# Patient Record
Sex: FEMALE | Race: BLACK OR AFRICAN AMERICAN | NOT HISPANIC OR LATINO | Employment: FULL TIME | URBAN - METROPOLITAN AREA
[De-identification: names, ages, dates, MRNs, and addresses within clinical notes are randomized per-mention and may not be internally consistent; named-entity substitution may affect disease eponyms.]

---

## 2022-07-12 ENCOUNTER — OFFICE VISIT (OUTPATIENT)
Dept: URGENT CARE | Facility: CLINIC | Age: 52
End: 2022-07-12
Payer: COMMERCIAL

## 2022-07-12 VITALS
WEIGHT: 165 LBS | HEART RATE: 97 BPM | BODY MASS INDEX: 28.17 KG/M2 | RESPIRATION RATE: 20 BRPM | TEMPERATURE: 99 F | HEIGHT: 64 IN | OXYGEN SATURATION: 98 % | SYSTOLIC BLOOD PRESSURE: 120 MMHG | DIASTOLIC BLOOD PRESSURE: 79 MMHG

## 2022-07-12 DIAGNOSIS — H10.9 BACTERIAL CONJUNCTIVITIS: ICD-10-CM

## 2022-07-12 DIAGNOSIS — T36.95XA ANTIBIOTIC-INDUCED YEAST INFECTION: ICD-10-CM

## 2022-07-12 DIAGNOSIS — B96.89 BACTERIAL SINUSITIS: Primary | ICD-10-CM

## 2022-07-12 DIAGNOSIS — B37.9 ANTIBIOTIC-INDUCED YEAST INFECTION: ICD-10-CM

## 2022-07-12 DIAGNOSIS — J32.9 BACTERIAL SINUSITIS: Primary | ICD-10-CM

## 2022-07-12 LAB
CTP QC/QA: YES
SARS-COV-2 RDRP RESP QL NAA+PROBE: NEGATIVE

## 2022-07-12 PROCEDURE — U0002: ICD-10-PCS | Mod: QW,S$GLB,, | Performed by: PHYSICIAN ASSISTANT

## 2022-07-12 PROCEDURE — 99203 OFFICE O/P NEW LOW 30 MIN: CPT | Mod: S$GLB,,, | Performed by: PHYSICIAN ASSISTANT

## 2022-07-12 PROCEDURE — 99203 PR OFFICE/OUTPT VISIT, NEW, LEVL III, 30-44 MIN: ICD-10-PCS | Mod: S$GLB,,, | Performed by: PHYSICIAN ASSISTANT

## 2022-07-12 PROCEDURE — U0002 COVID-19 LAB TEST NON-CDC: HCPCS | Mod: QW,S$GLB,, | Performed by: PHYSICIAN ASSISTANT

## 2022-07-12 RX ORDER — AMOXICILLIN AND CLAVULANATE POTASSIUM 875; 125 MG/1; MG/1
1 TABLET, FILM COATED ORAL 2 TIMES DAILY
Qty: 14 TABLET | Refills: 0 | Status: SHIPPED | OUTPATIENT
Start: 2022-07-12 | End: 2022-07-19

## 2022-07-12 RX ORDER — FLUCONAZOLE 150 MG/1
150 TABLET ORAL
Qty: 2 TABLET | Refills: 0 | Status: SHIPPED | OUTPATIENT
Start: 2022-07-12 | End: 2022-07-18

## 2022-07-12 RX ORDER — POLYMYXIN B SULFATE AND TRIMETHOPRIM 1; 10000 MG/ML; [USP'U]/ML
1 SOLUTION OPHTHALMIC EVERY 6 HOURS
Qty: 10 ML | Refills: 0 | Status: SHIPPED | OUTPATIENT
Start: 2022-07-12 | End: 2022-07-19

## 2022-07-12 RX ORDER — ATORVASTATIN CALCIUM 20 MG/1
20 TABLET, FILM COATED ORAL DAILY
COMMUNITY
Start: 2022-04-14

## 2022-07-12 NOTE — PATIENT INSTRUCTIONS
-Please take antibiotic to completion.  -Apply antibiotic drops to completion.  -Take diflucan now. Take second dose at completion of antibiotics.    Below are suggestions for symptomatic relief:   -Tylenol every 4 hours OR ibuprofen every 6 hours as needed for pain/fever.   -Salt water gargles to soothe throat pain.   -Chloroseptic spray also helps to numb throat pain.   -Nasal saline spray reduces inflammation and dryness.   -Warm face compresses to help with facial sinus pain/pressure.   -Vicks vapor rub at night.   -Flonase OTC or Nasacort OTC for nasal congestion.   -Simple foods like chicken noodle soup.   -Delsym helps with coughing at night   -Zyrtec/Claritin during the day & Benadryl at night may help with allergies.     If you DO NOT have Hypertension or any history of palpitations, it is ok to take over the counter Sudafed or Mucinex D or Allegra-D or Claritin-D or Zyrtec-D.  If you do take one of the above, it is ok to combine that with plain over the counter Mucinex or Allegra or Claritin or Zyrtec. If, for example, you are taking Zyrtec -D, you can combine that with Mucinex, but not Mucinex-D.  If you are taking Mucinex-D, you can combine that with plain Allegra or Claritin or Zyrtec.   If you DO have Hypertension or palpitations, it is safe to take Coricidin HBP for relief of sinus symptoms.    Please follow up with your primary care provider within 2-5 days if your signs and symptoms have not resolved or worsen.     If your condition worsens or fails to improve we recommend that you receive another evaluation at the emergency room immediately or contact your primary medical clinic to discuss your concerns.   You must understand that you have received an Urgent Care treatment only and that you may be released before all of your medical problems are known or treated. You, the patient, will arrange for follow up care as instructed.

## 2022-07-12 NOTE — PROGRESS NOTES
"Subjective:       Patient ID: Dorcas Rosado is a 52 y.o. female.    Vitals:  height is 5' 4" (1.626 m) and weight is 74.8 kg (165 lb). Her oral temperature is 98.7 °F (37.1 °C). Her blood pressure is 120/79 and her pulse is 97. Her respiration is 20 and oxygen saturation is 98%.     Chief Complaint: Sinus Problem and Eye Problem (Right )    Patient presents with sinus congestion that worsened about 3 days ago. She also reports right eye itchiness, redness, and pus drainage. Also complaining with bilateral ear fullness.    Sinus Problem  This is a new problem. The current episode started in the past 7 days (4 days ago). The problem has been gradually worsening since onset. There has been no fever. Her pain is at a severity of 0/10. She is experiencing no pain. Associated symptoms include congestion, coughing, ear pain (pressure), a hoarse voice, sinus pressure, sneezing and a sore throat. Pertinent negatives include no chills, headaches, neck pain or shortness of breath. Treatments tried: tylenol cold and sinus, phillip seltzer, mucinex d. The treatment provided no relief.       Constitution: Negative for chills, fatigue and fever.   HENT: Positive for ear pain (pressure), congestion, postnasal drip, sinus pain, sinus pressure and sore throat.    Neck: Negative for neck pain, neck stiffness and painful lymph nodes.   Cardiovascular: Negative for chest pain, palpitations and sob on exertion.   Eyes: Positive for eye discharge, eye itching and eye redness. Negative for eye trauma, foreign body in eye, eye pain, photophobia, vision loss, double vision, blurred vision and eyelid swelling.   Respiratory: Positive for cough. Negative for sputum production and shortness of breath.    Gastrointestinal: Negative for abdominal pain, nausea, vomiting and diarrhea.   Genitourinary: Negative for dysuria, hematuria and pelvic pain.   Musculoskeletal: Negative for pain, muscle cramps and muscle ache.   Skin: Negative for pale, rash " and wound.   Allergic/Immunologic: Positive for sneezing.   Neurological: Negative for dizziness, light-headedness and headaches.   Hematologic/Lymphatic: Negative for swollen lymph nodes.       Objective:      Physical Exam   Constitutional: She is oriented to person, place, and time. She appears well-developed. She is cooperative.  Non-toxic appearance. She does not appear ill. No distress.   HENT:   Head: Normocephalic and atraumatic.   Ears:   Right Ear: External ear and ear canal normal. Tympanic membrane is bulging. A middle ear effusion (clear) is present.   Left Ear: External ear and ear canal normal. Tympanic membrane is bulging. A middle ear effusion (clear) is present.   Nose: Mucosal edema and rhinorrhea present. Right sinus exhibits maxillary sinus tenderness. Right sinus exhibits no frontal sinus tenderness. Left sinus exhibits maxillary sinus tenderness. Left sinus exhibits no frontal sinus tenderness.   Mouth/Throat: Uvula is midline and mucous membranes are normal. Posterior oropharyngeal erythema and cobblestoning present. No oropharyngeal exudate or posterior oropharyngeal edema.   Eyes: EOM and lids are normal. Pupils are equal, round, and reactive to light. Right eye exhibits discharge (purulent). Left eye exhibits no discharge. Right conjunctiva is injected. No scleral icterus. Extraocular movement intact   Neck: Trachea normal and phonation normal. Neck supple.   Cardiovascular: Normal rate, regular rhythm and normal heart sounds.   No murmur heard.Exam reveals no gallop.   Pulmonary/Chest: Effort normal and breath sounds normal. No respiratory distress. She has no decreased breath sounds. She has no wheezes. She has no rhonchi. She has no rales.   Musculoskeletal:         General: No deformity.   Lymphadenopathy:        Head (right side): No submandibular and no tonsillar adenopathy present.        Head (left side): No submandibular and no tonsillar adenopathy present.   Neurological: She is  alert and oriented to person, place, and time. Coordination normal.   Skin: Skin is warm, dry, intact, not diaphoretic and not pale.   Psychiatric: Her speech is normal and behavior is normal. Judgment and thought content normal.   Nursing note and vitals reviewed.        Assessment:       1. Bacterial sinusitis    2. Antibiotic-induced yeast infection    3. Bacterial conjunctivitis        Office Visit on 07/12/2022   Component Date Value Ref Range Status    POC Rapid COVID 07/12/2022 Negative  Negative Final-Edited     Acceptable 07/12/2022 Yes   Final-Edited      Plan:         Bacterial sinusitis  -     POCT COVID-19 Rapid Screening  -     amoxicillin-clavulanate 875-125mg (AUGMENTIN) 875-125 mg per tablet; Take 1 tablet by mouth 2 (two) times daily. for 7 days  Dispense: 14 tablet; Refill: 0    Antibiotic-induced yeast infection  -     fluconazole (DIFLUCAN) 150 MG Tab; Take 1 tablet (150 mg total) by mouth Every 3 (three) days. Take 1st dose now. Take second dose after 3 days if symptoms persist. for 6 days  Dispense: 2 tablet; Refill: 0    Bacterial conjunctivitis  -     polymyxin B sulf-trimethoprim (POLYTRIM) 10,000 unit- 1 mg/mL Drop; Place 1 drop into the right eye every 6 (six) hours. for 7 days  Dispense: 10 mL; Refill: 0      Patient Instructions   -Please take antibiotic to completion.  -Apply antibiotic drops to completion.  -Take diflucan now. Take second dose at completion of antibiotics.    Below are suggestions for symptomatic relief:   -Tylenol every 4 hours OR ibuprofen every 6 hours as needed for pain/fever.   -Salt water gargles to soothe throat pain.   -Chloroseptic spray also helps to numb throat pain.   -Nasal saline spray reduces inflammation and dryness.   -Warm face compresses to help with facial sinus pain/pressure.   -Vicks vapor rub at night.   -Flonase OTC or Nasacort OTC for nasal congestion.   -Simple foods like chicken noodle soup.   -Delsym helps with coughing at  night   -Zyrtec/Claritin during the day & Benadryl at night may help with allergies.     If you DO NOT have Hypertension or any history of palpitations, it is ok to take over the counter Sudafed or Mucinex D or Allegra-D or Claritin-D or Zyrtec-D.  If you do take one of the above, it is ok to combine that with plain over the counter Mucinex or Allegra or Claritin or Zyrtec. If, for example, you are taking Zyrtec -D, you can combine that with Mucinex, but not Mucinex-D.  If you are taking Mucinex-D, you can combine that with plain Allegra or Claritin or Zyrtec.   If you DO have Hypertension or palpitations, it is safe to take Coricidin HBP for relief of sinus symptoms.    Please follow up with your primary care provider within 2-5 days if your signs and symptoms have not resolved or worsen.     If your condition worsens or fails to improve we recommend that you receive another evaluation at the emergency room immediately or contact your primary medical clinic to discuss your concerns.   You must understand that you have received an Urgent Care treatment only and that you may be released before all of your medical problems are known or treated. You, the patient, will arrange for follow up care as instructed.

## 2022-07-12 NOTE — LETTER
July 12, 2022      [facility]  [address] [address]  Phone: [phone]  Fax: [phone]       Patient: Dorcas Rosado   YOB: 1970  Date of Visit: 07/12/2022    To Whom It May Concern:    Michael Rosado  was at [facility] on 07/12/2022. The patient may return to work/school on 07/14/2022 with no restrictions. If you have any questions or concerns, or if I can be of further assistance, please do not hesitate to contact me.    Sincerely,    Lorena Leger PA-C

## 2022-07-24 ENCOUNTER — NURSE TRIAGE (OUTPATIENT)
Dept: ADMINISTRATIVE | Facility: CLINIC | Age: 52
End: 2022-07-24
Payer: COMMERCIAL

## 2022-07-24 ENCOUNTER — OFFICE VISIT (OUTPATIENT)
Dept: URGENT CARE | Facility: CLINIC | Age: 52
End: 2022-07-24
Payer: COMMERCIAL

## 2022-07-24 VITALS
SYSTOLIC BLOOD PRESSURE: 144 MMHG | OXYGEN SATURATION: 98 % | RESPIRATION RATE: 18 BRPM | DIASTOLIC BLOOD PRESSURE: 81 MMHG | TEMPERATURE: 99 F | HEIGHT: 64 IN | BODY MASS INDEX: 28.17 KG/M2 | HEART RATE: 92 BPM | WEIGHT: 165 LBS

## 2022-07-24 DIAGNOSIS — J01.90 ACUTE NON-RECURRENT SINUSITIS, UNSPECIFIED LOCATION: ICD-10-CM

## 2022-07-24 DIAGNOSIS — J02.9 SORE THROAT: Primary | ICD-10-CM

## 2022-07-24 DIAGNOSIS — I10 HYPERTENSION, UNSPECIFIED TYPE: ICD-10-CM

## 2022-07-24 DIAGNOSIS — H92.09 OTALGIA, UNSPECIFIED LATERALITY: ICD-10-CM

## 2022-07-24 DIAGNOSIS — J30.9 ALLERGIC RHINITIS, UNSPECIFIED SEASONALITY, UNSPECIFIED TRIGGER: ICD-10-CM

## 2022-07-24 LAB
CTP QC/QA: YES
SARS-COV-2 RDRP RESP QL NAA+PROBE: NEGATIVE

## 2022-07-24 PROCEDURE — 99214 PR OFFICE/OUTPT VISIT, EST, LEVL IV, 30-39 MIN: ICD-10-PCS | Mod: S$GLB,,, | Performed by: FAMILY MEDICINE

## 2022-07-24 PROCEDURE — 99214 OFFICE O/P EST MOD 30 MIN: CPT | Mod: S$GLB,,, | Performed by: FAMILY MEDICINE

## 2022-07-24 PROCEDURE — U0002: ICD-10-PCS | Mod: QW,S$GLB,, | Performed by: FAMILY MEDICINE

## 2022-07-24 PROCEDURE — U0002 COVID-19 LAB TEST NON-CDC: HCPCS | Mod: QW,S$GLB,, | Performed by: FAMILY MEDICINE

## 2022-07-24 RX ORDER — PREDNISONE 10 MG/1
10 TABLET ORAL 2 TIMES DAILY
Qty: 6 TABLET | Refills: 0 | Status: SHIPPED | OUTPATIENT
Start: 2022-07-24 | End: 2022-07-27

## 2022-07-24 NOTE — PROGRESS NOTES
"Subjective:       Patient ID: Dorcas Rosado is a 52 y.o. female.    Vitals:  height is 5' 4" (1.626 m) and weight is 74.8 kg (165 lb). Her temperature is 98.6 °F (37 °C). Her blood pressure is 144/81 (abnormal) and her pulse is 92. Her respiration is 18 and oxygen saturation is 98%.     Chief Complaint: Otalgia    This is a 52 y.o. female who presents today with a chief complaint of ear pain in both ears, sore throat, and post nasal drip x 12 days . Pt states that she came here on 7/12 and got antibiotics and was told there was fluid in her ears. Pt finished the antibiotics and her symptoms have not gotten any better. Treated with antibiotics, delsym, Mucinex.  Pressure reports history of sinus allergies.  Patient is on Xyzal and Flonase. Denies fever, chills, headache, body aches, chest pain, shortness of breath.  Patient also reports history of borderline hypertension.       Otalgia   There is pain in both ears. This is a new problem. The current episode started 1 to 4 weeks ago. The problem has been unchanged. There has been no fever. The pain is at a severity of 3/10. The pain is mild. Associated symptoms include a sore throat. Pertinent negatives include no abdominal pain, coughing, diarrhea or ear discharge. Associated symptoms comments: Post nasal drip. Treatments tried:  antibiotics, delsym, Mucinex. The treatment provided mild relief. There is no history of a chronic ear infection or hearing loss.       HENT: Positive for ear pain and sore throat. Negative for ear discharge.    Respiratory: Negative for cough.    Gastrointestinal: Negative for abdominal pain and diarrhea.       Objective:      Physical Exam   Constitutional: She is oriented to person, place, and time. She appears well-developed. She is cooperative.  Non-toxic appearance. She does not appear ill. No distress.   HENT:   Head: Normocephalic and atraumatic.   Ears:   Right Ear: Hearing, tympanic membrane, external ear and ear canal normal. " impacted cerumen  Left Ear: Hearing, tympanic membrane, external ear and ear canal normal. impacted cerumen  Nose: Congestion present. No mucosal edema, rhinorrhea or nasal deformity. No epistaxis. Right sinus exhibits no maxillary sinus tenderness and no frontal sinus tenderness. Left sinus exhibits no maxillary sinus tenderness and no frontal sinus tenderness.   Mouth/Throat: Uvula is midline, oropharynx is clear and moist and mucous membranes are normal. No trismus in the jaw. Normal dentition. No uvula swelling.   Eyes: Conjunctivae and lids are normal. Right eye exhibits no discharge. Left eye exhibits no discharge. No scleral icterus.   Neck: Trachea normal and phonation normal. Neck supple.   Cardiovascular: Normal rate, regular rhythm, normal heart sounds and normal pulses.   No murmur heard.  Pulmonary/Chest: Effort normal. No stridor. No respiratory distress. She has no wheezes. She has no rhonchi. She has no rales.   Abdominal: Normal appearance and bowel sounds are normal. She exhibits no distension and no mass. Soft. There is no abdominal tenderness.   Musculoskeletal: Normal range of motion.         General: No deformity. Normal range of motion.      Cervical back: She exhibits no tenderness.   Lymphadenopathy:     She has no cervical adenopathy.   Neurological: She is alert and oriented to person, place, and time. She exhibits normal muscle tone. Coordination normal.   Skin: Skin is warm, dry, intact, not diaphoretic and not pale.   Psychiatric: Her speech is normal and behavior is normal. Judgment and thought content normal.   Nursing note and vitals reviewed.        Assessment:       1. Sore throat    2. Allergic rhinitis, unspecified seasonality, unspecified trigger    3. Acute non-recurrent sinusitis, unspecified location    4. Hypertension, unspecified type    5. Otalgia, unspecified laterality          Plan:         Sore throat  -     POCT COVID-19 Rapid Screening    Allergic rhinitis,  unspecified seasonality, unspecified trigger    Acute non-recurrent sinusitis, unspecified location    Hypertension, unspecified type    Otalgia, unspecified laterality    Other orders  -     predniSONE (DELTASONE) 10 MG tablet; Take 1 tablet (10 mg total) by mouth 2 (two) times daily. for 3 days  Dispense: 6 tablet; Refill: 0

## 2022-07-24 NOTE — TELEPHONE ENCOUNTER
"    Dorcas states she was given medication at [facility] for bacterial sinusitis.  Says she has finished all meds she was givne at that visit on 07/12/2022.  States she still feels like her ears bilaterally are blocked and feels pressure and pain in the ears. Also states she feels swollen lymph nodes in her neck.  Decreased hearing (muffled).  Ears popping.  Two negative Covid 19 tests, yesterday and today.  No fever. Per [facility] triage protocol, recommend she be seen within 24 hours. She was given address / hours to [facility] close to her home.  She said she will go today.  No pcp.  Please contact caller directly with any additional care advice.       Reason for Disposition   Moving the earlobe or touching the ear clearly increases the pain   Decreased hearing (or another adult says that the ear canal is completely blocked with discharge)   Swollen lymph node near ear    Additional Information   Negative: Recently examined and diagnosed with "Otitis Externa" or "Swimmer's Ear"   Negative: [1] Stiff neck (can't touch chin to chest) AND [2] fever   Negative: [1] Stiff neck (can't touch chin to chest) AND [2] headache   Negative: Bony area of skull behind the ear is pink or swollen   Negative: Patient sounds very sick or weak to the triager   Negative: [1] SEVERE pain and [2] not improved 2 hours after analgesic medication (e.g., ibuprofen or acetaminophen)     Pain to ears 5 of 10.   Negative: Fever > 100.4 F (38.0 C)   Negative: Redness or swelling of outer ear (ear lobe)   Negative: [1] Stiff neck (can't touch chin to chest) AND [2] no fever or headache   Negative: Diabetes mellitus or weak immune system (e.g., HIV positive, cancer chemo, splenectomy, organ transplant, chronic steroids)   Negative: Yellow or green discharge from ear canal    Protocols used: EARACHE-A-AH, EAR - SWIMMER'S-A-AH      "

## 2023-01-25 ENCOUNTER — OFFICE VISIT (OUTPATIENT)
Dept: HEMATOLOGY/ONCOLOGY | Facility: CLINIC | Age: 53
End: 2023-01-25
Payer: COMMERCIAL

## 2023-01-25 VITALS
WEIGHT: 178.56 LBS | DIASTOLIC BLOOD PRESSURE: 74 MMHG | HEIGHT: 64 IN | OXYGEN SATURATION: 99 % | TEMPERATURE: 98 F | HEART RATE: 98 BPM | SYSTOLIC BLOOD PRESSURE: 143 MMHG | RESPIRATION RATE: 18 BRPM | BODY MASS INDEX: 30.48 KG/M2

## 2023-01-25 DIAGNOSIS — Z91.89 AT HIGH RISK FOR BREAST CANCER: Primary | ICD-10-CM

## 2023-01-25 DIAGNOSIS — N60.99 ATYPICAL DUCTAL HYPERPLASIA OF BREAST: ICD-10-CM

## 2023-01-25 PROCEDURE — 99999 PR PBB SHADOW E&M-EST. PATIENT-LVL III: ICD-10-PCS | Mod: PBBFAC,,, | Performed by: STUDENT IN AN ORGANIZED HEALTH CARE EDUCATION/TRAINING PROGRAM

## 2023-01-25 PROCEDURE — 99204 OFFICE O/P NEW MOD 45 MIN: CPT | Mod: S$GLB,,, | Performed by: STUDENT IN AN ORGANIZED HEALTH CARE EDUCATION/TRAINING PROGRAM

## 2023-01-25 PROCEDURE — 99204 PR OFFICE/OUTPT VISIT, NEW, LEVL IV, 45-59 MIN: ICD-10-PCS | Mod: S$GLB,,, | Performed by: STUDENT IN AN ORGANIZED HEALTH CARE EDUCATION/TRAINING PROGRAM

## 2023-01-25 PROCEDURE — 99999 PR PBB SHADOW E&M-EST. PATIENT-LVL III: CPT | Mod: PBBFAC,,, | Performed by: STUDENT IN AN ORGANIZED HEALTH CARE EDUCATION/TRAINING PROGRAM

## 2023-01-25 RX ORDER — LINACLOTIDE 145 UG/1
145 CAPSULE, GELATIN COATED ORAL EVERY MORNING
COMMUNITY
Start: 2022-12-22

## 2023-01-25 RX ORDER — MELOXICAM 15 MG/1
15 TABLET ORAL DAILY PRN
COMMUNITY
Start: 2022-10-15

## 2023-01-25 RX ORDER — LIFITEGRAST 50 MG/ML
1 SOLUTION/ DROPS OPHTHALMIC 2 TIMES DAILY
COMMUNITY
Start: 2022-10-04

## 2023-01-25 RX ORDER — CHOLECALCIFEROL (VITAMIN D3) 50 MCG
TABLET ORAL
COMMUNITY

## 2023-01-26 PROBLEM — Z91.89 AT HIGH RISK FOR BREAST CANCER: Status: ACTIVE | Noted: 2023-01-26

## 2023-01-26 PROBLEM — N60.99 ATYPICAL DUCTAL HYPERPLASIA OF BREAST: Status: ACTIVE | Noted: 2023-01-26

## 2023-01-26 NOTE — PROGRESS NOTES
Reason For Consultation:   Increased lifetime risk of breast cancer    Referring Provider:   None provided    Records Obtained: Records of the patients history including those obtained from the referring provider were reviewed and summarized in detail.    HPI:   Dorcas Rosado is a carolee 53yo woman who presents for consultation of increased risk of breast cancer. She is postmenopausal. She presented for screening mammogram on 2/10/22 which was benign; T-C lifetime risk 43.1%.     Today she reports that she is feeling well. Denies recent breast changes or concerns. Recently moved here from [place] and was following with breast specialist there.     High Risk Breast cancer specific history:  - Age: 52 y.o.   - Height:  5'4''  - Weight: 172lbs  - Breast density per BI-RADS:  heterogeneously dense  - Age at menarche:  12yo  - Number of pregnancies: ; age of first live birth: 16yo  - History of breast feeding: No.   - Age at menopause, if applicable:  s/p IDALIA-BSO in 2020. Denies menopausal symptoms.  -Uterus and ovaries intact: No: s/p IDALIA-BSO as above  - HRT: Yes- on estrogen and progesterone x 2.5 years, stopped in   - Genetic testing: Yes - BRCA negative   - Personal history of cancer: No  - Previous chest radiation exposure between ages 10-30 years old: No  - Personal history of breast biopsy: Yes - hx of multiple biopsies, several benign and one ADH  - Ashkenazi Worship Inheritance: No  - Family history of cancer:  Yes- paternal aunt- breast cancer dx at 51yo, paternal cousin- breast cancer dx at 39yo    Social History:  Tobacco use:  Denies  Alcohol use:  Socially  Exercise regimen: Walks regularly  Employment: works from home    SEE CALCULATED RISK BELOW.     Past Medical   Past Medical History:   Diagnosis Date    Allergy    There is no problem list on file for this patient.    Social History   Social History     Tobacco Use    Smoking status: Former    Smokeless tobacco: Never   Substance Use  "Topics    Alcohol use: Not Currently    Drug use: Never     Family History  Family History   Problem Relation Age of Onset    Diabetes Mother     Diabetes Father      Medications    Current Outpatient Medications:     atorvastatin (LIPITOR) 20 MG tablet, Take 20 mg by mouth once daily., Disp: , Rfl:     cholecalciferol, vitamin D3, (VITAMIN D3) 50 mcg (2,000 unit) Tab, Take by mouth., Disp: , Rfl:     ergocalciferol, vitamin D2, (VITAMIN D ORAL), , Disp: , Rfl:     finasteride-tadalafil 5-5 mg Cap, , Disp: , Rfl:     LINZESS 145 mcg Cap capsule, Take 145 mcg by mouth every morning., Disp: , Rfl:     meloxicam (MOBIC) 15 MG tablet, Take 15 mg by mouth daily as needed., Disp: , Rfl:     spironolactone-hydrochlorothiazide (ALDACTAZIDE) 50-50 mg per tablet, , Disp: , Rfl:     XIIDRA 5 % Dpet, Place 1 drop into both eyes 2 (two) times daily., Disp: , Rfl:     Allergies  Review of patient's allergies indicates:   Allergen Reactions    Hydrocodone Nausea Only and Nausea And Vomiting       Review of Systems       See above   All other systems reviewed and are negative.    Objective:      Vitals:   Vitals:    01/25/23 1606   BP: (!) 143/74   Pulse: 98   Resp: 18   Temp: 98.2 °F (36.8 °C)   TempSrc: Oral   SpO2: 99%   Weight: 81 kg (178 lb 9.2 oz)   Height: 5' 4" (1.626 m)     BMI: Body mass index is 30.65 kg/m².   Body surface area is 1.91 meters squared.    Physical Exam  ECOG 0   General: well appearing, in no apparent distress  HEENT: Normocephalic, EOMI, anicteric sclerae, MMM  Neck: supple, without cervical or supraclavicular lymphadenopathy.  Heart: regular rate and rhythm, normal S1 and S2, no murmurs, gallops or rubs.  Lungs: Clear to auscultation bilaterally, no increased wob  Breast: no appreciable masses, skin changes, or nipple abnormality bilaterally, no axillary LAD  Abdomen: Soft, nontender, nondistended with normal bowel sounds. No hepatosplenomegaly.  Extremities: No LE edema or joint effusion  Skin: warm, " well-perfused, no rash  Neurologic: Alert and oriented x 4, normal speech and gait   Psychiatric: Conversing appropriately with providers throughout today's encounter.      Laboratory Data: reviewed most recent   Imaging: reviewed most recent    Assessment:   Ms. Rosado is a carolee 53yo woman with hx of ADH who presents today for evaluation for increased risk of breast cancer.     1. Increased risk of breast cancer/History of ADH:  Tyrer-Cuzick (TC) lifetime risk [facility] 36.6[facility] We discussed that TC score will categorize your lifetime risk of being diagnosed with breast cancer. Categories are as such: Average risk <15%, Intermediate risk 15-19%, and High risk > or = to 20%. I reviewed recommendations for women with elevated TC score including clinical breast exam q6-12mo, consideration of referral to genetic counseling, annual screening mammogram, annual breast MRI, consideration of risk reduction strategies and breast awareness.      We discussed that risk factors are categorized into 2 groups: Modifiable and Non-modifiable. Modifiable risk factors include use of hormones, alcohol, smoking, diet and exercise. Non-modifiable risk factors include breast density, genetics, chest radiation, previous pregnancies, age of first period, and age of menopause. For women at high risk for breast cancer, endocrine therapy can reduce the risk of invasive and/or in situ breast cancers. Discussed that Tamoxifen 20 mg daily for 5 years (or tamoxifen 5mg daily for 3 years) has shown to reduce risk of breast cancer by 49% and women with ADH/ALH or LCIS have an even more significant reduction of risk of 86%. Aromatase inhibitors for 5 years have also shown risk reduction in terms of 50-60%. At current, there is not adequate data to recommend longer courses of therapy more than 5 years for risk reduction. While the above have been shown to lower the risk of breast cancer incidence, there is no survival benefit  in patients who don't have breast cancer.     We reviewed Lifestyle modifications which have shown benefit:  Limit alcohol consumption to less than 1 drink per day (1 ounce liquor, 6 oz wine, 8 oz beer)  Avoid smoking.  Exercise at least 150 minutes per week of moderate intensity aerobic activity or at least 75 minutes of vigorous activity. Exercise can lower the relative risk of breast cancer by ~18-20%.  Maintain healthy weight and avoid post-menopausal weight gain. Avoid processed foods and eat more lean proteins, fruits and vegetables.     Discussed available resources including genetic counseling, nutrition, weight management and reviewed the plan for future screening:   Semiannual (every 6 months) CBE (clinical breast exam).   Annual screening mammogram alternating with annual breast US (pt would prefer this over MRI)      Plan:     Patient has opted out of chemoprevention but will call in the future if she wishes to pursue. Can discuss further at follow up.   Patient elects to proceed with alternating annual mammogram with annual breast US (would prefer this to MRI)  Patient will follow up with PCP or GYN for semiannual CBE along with annual mammogram in 2/2023 (will see if she would be a good candidate for contrast enhanced mammography) and annual breast US in 8/2023 (can discuss at time of follow up if she would prefer to proceed with US or mammogram).   Lifestyle modifications as detailed above.   5.   Encouraged breast awareness, including monthly breast self-exams.       Questions were encouraged and answered to patient's satisfaction, and patient verbalized understanding of information and agreement with the plan. Advised patient to RTC with any interval changes or concerns.  RTC for virtual visit in 6 months.     Gail Mo MD      Med Onc Chart Routing  Urgent    Follow up with physician 6 months. Virtual visit in 6mo   Follow up with [facility]    Infusion scheduling note    Injection scheduling note     Labs    Imaging Mammogram   Screening mammogram due in Feb   Pharmacy appointment    Other referrals

## 2023-01-27 ENCOUNTER — TELEPHONE (OUTPATIENT)
Dept: HEMATOLOGY/ONCOLOGY | Facility: CLINIC | Age: 53
End: 2023-01-27
Payer: COMMERCIAL

## 2023-01-27 NOTE — TELEPHONE ENCOUNTER
----- Message from Gail Mo MD sent at 1/27/2023 11:32 AM CST -----  Regarding: RE: orders  Contact: [phone]  Sure, just placed new order. thanks  ----- Message -----  From: Meliza Billingsley RN  Sent: 1/27/2023  10:25 AM CST  To: Gail Mo MD  Subject: FW: orders                                       Can you change her order to reflect 3d mammo  ----- Message -----  From: Kiki Lorenz  Sent: 1/27/2023  10:16 AM CST  To: [facility] Staff  Subject: orders                                           Pt requesting order change for Mammo exam. Pt states orders need to state 3D Mammo. Please call to discuss further.

## 2023-03-01 ENCOUNTER — HOSPITAL ENCOUNTER (OUTPATIENT)
Dept: RADIOLOGY | Facility: HOSPITAL | Age: 53
Discharge: HOME | End: 2023-03-01
Attending: STUDENT IN AN ORGANIZED HEALTH CARE EDUCATION/TRAINING PROGRAM
Payer: COMMERCIAL

## 2023-03-01 VITALS — BODY MASS INDEX: 29.37 KG/M2 | WEIGHT: 172 LBS | HEIGHT: 64 IN

## 2023-03-01 DIAGNOSIS — N60.99 ATYPICAL DUCTAL HYPERPLASIA OF BREAST: ICD-10-CM

## 2023-03-01 DIAGNOSIS — Z91.89 AT HIGH RISK FOR BREAST CANCER: ICD-10-CM

## 2023-03-01 PROCEDURE — 77067 MAMMO DIGITAL SCREENING WITH CONTRAST, BILATERAL: ICD-10-PCS | Mod: 26,,, | Performed by: RADIOLOGY

## 2023-03-01 PROCEDURE — 77067 SCR MAMMO BI INCL CAD: CPT | Mod: 26,,, | Performed by: RADIOLOGY

## 2023-03-01 PROCEDURE — 77067 SCR MAMMO BI INCL CAD: CPT | Mod: TC

## 2023-03-01 PROCEDURE — 25500020 PHARM REV CODE 255: Performed by: STUDENT IN AN ORGANIZED HEALTH CARE EDUCATION/TRAINING PROGRAM

## 2023-03-01 RX ADMIN — IOHEXOL 100 ML: 350 INJECTION, SOLUTION INTRAVENOUS at 11:03

## 2023-04-20 ENCOUNTER — PATIENT MESSAGE (OUTPATIENT)
Dept: HEMATOLOGY/ONCOLOGY | Facility: CLINIC | Age: 53
End: 2023-04-20
Payer: COMMERCIAL

## 2023-04-21 ENCOUNTER — PATIENT MESSAGE (OUTPATIENT)
Dept: HEMATOLOGY/ONCOLOGY | Facility: CLINIC | Age: 53
End: 2023-04-21
Payer: COMMERCIAL

## 2023-08-11 ENCOUNTER — TELEPHONE (OUTPATIENT)
Dept: HEMATOLOGY/ONCOLOGY | Facility: CLINIC | Age: 53
End: 2023-08-11
Payer: COMMERCIAL

## 2023-09-05 ENCOUNTER — TELEPHONE (OUTPATIENT)
Dept: HEMATOLOGY/ONCOLOGY | Facility: CLINIC | Age: 53
End: 2023-09-05
Payer: COMMERCIAL

## 2023-09-05 NOTE — TELEPHONE ENCOUNTER
----- Message from Monica Martinez sent at 9/5/2023  9:11 AM CDT -----  Regarding: Mammo Appt  Contact: Pt  Pt is requesting a callback regarding Mammo Appt. Pt stated she have to be schedule at the [facility] Location for a diagnostic Mammo. Pt would like to be seen on today. Please adv       Confirmed contact below:   Contact Name:Dorcasbaltazar Ponce  Phone Number: [phone]

## 2023-09-05 NOTE — TELEPHONE ENCOUNTER
Reached out to [facility] Dias. Stated they will contact the pt that follow up mammo is not needed at this time (last results 3/1/23 benign).

## 2023-09-05 NOTE — TELEPHONE ENCOUNTER
----- Message from Sweta Catherine sent at 9/5/2023  4:47 PM CDT -----  Regarding: self [phone]  Type:  Mammogram       Caller is requesting to schedule their annual mammogram appointment.  Order is not listed in EPIC.  Please enter order and contact patient to schedule.    Name of Caller: Patient       Where would they like the mammogram performed? [facility]       Would the patient rather a call back or a response via [facility]? either       Best Call Back Number: [phone]       Additional Information: Patient requesting the order to be sent for March of 2024. Patient states she is needing this yearly. Thank you

## 2023-09-18 DIAGNOSIS — Z91.89 AT HIGH RISK FOR BREAST CANCER: Primary | ICD-10-CM

## 2023-09-18 NOTE — PROGRESS NOTES
Pt high risk and opts for bilateral breast US rather than MRI. Will alternate this with yearly screening MMG (had contrast enhanced MMG in 3/2023 which was benign).

## 2023-09-19 ENCOUNTER — PATIENT MESSAGE (OUTPATIENT)
Dept: HEMATOLOGY/ONCOLOGY | Facility: CLINIC | Age: 53
End: 2023-09-19
Payer: COMMERCIAL

## 2023-09-20 ENCOUNTER — PATIENT MESSAGE (OUTPATIENT)
Dept: HEMATOLOGY/ONCOLOGY | Facility: CLINIC | Age: 53
End: 2023-09-20
Payer: COMMERCIAL

## 2023-09-21 ENCOUNTER — TELEPHONE (OUTPATIENT)
Dept: RADIOLOGY | Facility: HOSPITAL | Age: 53
End: 2023-09-21
Payer: COMMERCIAL

## 2023-09-21 NOTE — TELEPHONE ENCOUNTER
----- Message from Kg Preciado sent at 9/21/2023 12:44 PM CDT -----  Regarding: FW: Reschedule appt  Contact: [phone]    ----- Message -----  From: Pavan Jimenez  Sent: 9/21/2023  12:22 PM CDT  To: #  Subject: Reschedule appt                                  Hi, pt is scheduled for Monday afternoon and is asking to spk with someone to reschedule the time to something earlier(because she is leaving town) or tomorrow. Pls call the pt at [phone] to help pt change appt.

## 2023-09-25 ENCOUNTER — HOSPITAL ENCOUNTER (OUTPATIENT)
Dept: RADIOLOGY | Facility: HOSPITAL | Age: 53
Discharge: HOME | End: 2023-09-25
Attending: STUDENT IN AN ORGANIZED HEALTH CARE EDUCATION/TRAINING PROGRAM
Payer: COMMERCIAL

## 2023-09-25 DIAGNOSIS — Z91.89 AT HIGH RISK FOR BREAST CANCER: ICD-10-CM

## 2023-09-25 PROCEDURE — 76641 ULTRASOUND BREAST COMPLETE: CPT | Mod: 26,50,, | Performed by: RADIOLOGY

## 2023-09-25 PROCEDURE — 76641 ULTRASOUND BREAST COMPLETE: CPT | Mod: TC,50

## 2023-09-25 PROCEDURE — 76641 US BREAST BILATERAL COMPLETE: ICD-10-PCS | Mod: 26,50,, | Performed by: RADIOLOGY

## 2024-02-12 ENCOUNTER — TELEPHONE (OUTPATIENT)
Dept: HEMATOLOGY/ONCOLOGY | Facility: CLINIC | Age: 54
End: 2024-02-12
Payer: COMMERCIAL

## 2024-02-12 DIAGNOSIS — Z91.89 AT HIGH RISK FOR BREAST CANCER: Primary | ICD-10-CM

## 2024-02-12 DIAGNOSIS — N60.99 ATYPICAL DUCTAL HYPERPLASIA OF BREAST: ICD-10-CM

## 2024-02-12 NOTE — TELEPHONE ENCOUNTER
Order placed  Message sent to [email or web address].     Pt made aware she is thankful for call.       ----- Message from Yadira Hamilton sent at 2/12/2024  2:45 PM CST -----  Regarding: requesting orders  Contact: pt @[phone]  Pt is calling to speak with someone in the office to request and order for MAMMO SCREEN WITH CONTRAST     Pt is asking for a return call back once the order is in, so that they can be scheduled. Please call pt to advise. Thanks.

## 2024-02-14 ENCOUNTER — TELEPHONE (OUTPATIENT)
Dept: RADIOLOGY | Facility: HOSPITAL | Age: 54
End: 2024-02-14
Payer: COMMERCIAL

## 2024-02-14 NOTE — TELEPHONE ENCOUNTER
[facility] for 3[facility]8/2024 . Patient was instructed on fasting for the contrast mammogram, nothing to eat or drink 4 hours prior to the contrast mammogram.

## 2024-03-07 ENCOUNTER — TELEPHONE (OUTPATIENT)
Dept: RADIOLOGY | Facility: HOSPITAL | Age: 54
End: 2024-03-07
Payer: COMMERCIAL

## 2024-03-08 ENCOUNTER — HOSPITAL ENCOUNTER (OUTPATIENT)
Dept: RADIOLOGY | Facility: HOSPITAL | Age: 54
Discharge: HOME | End: 2024-03-08
Attending: STUDENT IN AN ORGANIZED HEALTH CARE EDUCATION/TRAINING PROGRAM
Payer: COMMERCIAL

## 2024-03-08 DIAGNOSIS — N60.99 ATYPICAL DUCTAL HYPERPLASIA OF BREAST: ICD-10-CM

## 2024-03-08 DIAGNOSIS — Z91.89 AT HIGH RISK FOR BREAST CANCER: ICD-10-CM

## 2024-03-08 PROCEDURE — 25500020 PHARM REV CODE 255: Performed by: STUDENT IN AN ORGANIZED HEALTH CARE EDUCATION/TRAINING PROGRAM

## 2024-03-08 PROCEDURE — 77067 SCR MAMMO BI INCL CAD: CPT | Mod: 26,,, | Performed by: RADIOLOGY

## 2024-03-08 PROCEDURE — 77067 SCR MAMMO BI INCL CAD: CPT | Mod: TC

## 2024-03-08 RX ADMIN — IOHEXOL 100 ML: 350 INJECTION, SOLUTION INTRAVENOUS at 08:03

## 2024-09-24 DIAGNOSIS — Z91.89 AT HIGH RISK FOR BREAST CANCER: Primary | ICD-10-CM

## 2024-09-25 ENCOUNTER — TELEPHONE (OUTPATIENT)
Dept: HEMATOLOGY/ONCOLOGY | Facility: CLINIC | Age: 54
End: 2024-09-25
Payer: COMMERCIAL

## 2024-09-25 ENCOUNTER — TELEPHONE (OUTPATIENT)
Dept: RADIOLOGY | Facility: HOSPITAL | Age: 54
End: 2024-09-25
Payer: COMMERCIAL

## 2024-09-25 NOTE — TELEPHONE ENCOUNTER
----- Message from Ambar Feldman sent at 9/23/2024 11:05 AM CDT -----  Regarding: Patient advice  Contact: Pt  [phone]            Name of Caller:  Dorcas      Contact Preference:  [phone]    Nature of Call: Called to find out if it is time for a ultrasound or not states they usually every 6 months

## 2024-09-25 NOTE — TELEPHONE ENCOUNTER
----- Message from Florinda Key RN sent at 9/25/2024  8:17 AM CDT -----  Regarding: please schedule  Good Morning     Rosalino can you please schedule the patient for Breast US and send message to Anita once completed so that she may schedule patient for f/u visit. Orders are in. Thank you in advance.

## 2024-10-02 ENCOUNTER — HOSPITAL ENCOUNTER (OUTPATIENT)
Dept: RADIOLOGY | Facility: HOSPITAL | Age: 54
Discharge: HOME | End: 2024-10-02
Attending: STUDENT IN AN ORGANIZED HEALTH CARE EDUCATION/TRAINING PROGRAM
Payer: COMMERCIAL

## 2024-10-02 DIAGNOSIS — Z91.89 AT HIGH RISK FOR BREAST CANCER: ICD-10-CM

## 2024-10-02 PROCEDURE — 76641 ULTRASOUND BREAST COMPLETE: CPT | Mod: TC,50

## 2024-10-08 ENCOUNTER — TELEPHONE (OUTPATIENT)
Dept: HEMATOLOGY/ONCOLOGY | Facility: CLINIC | Age: 54
End: 2024-10-08
Payer: COMMERCIAL

## 2024-10-08 NOTE — TELEPHONE ENCOUNTER
Contacted patient via phone call. Patient answered. Scheduled lost to follow up visit. Appointment scheduled. Date and time confirmed with patient.

## 2024-10-15 ENCOUNTER — OFFICE VISIT (OUTPATIENT)
Dept: HEMATOLOGY/ONCOLOGY | Facility: CLINIC | Age: 54
End: 2024-10-15
Payer: COMMERCIAL

## 2024-10-15 VITALS
HEIGHT: 64 IN | WEIGHT: 154.31 LBS | RESPIRATION RATE: 20 BRPM | BODY MASS INDEX: 26.34 KG/M2 | DIASTOLIC BLOOD PRESSURE: 77 MMHG | SYSTOLIC BLOOD PRESSURE: 126 MMHG | HEART RATE: 86 BPM | OXYGEN SATURATION: 100 %

## 2024-10-15 DIAGNOSIS — Z91.89 AT HIGH RISK FOR BREAST CANCER: Primary | ICD-10-CM

## 2024-10-15 PROCEDURE — 99999 PR PBB SHADOW E&M-EST. PATIENT-LVL III: CPT | Mod: PBBFAC,,, | Performed by: STUDENT IN AN ORGANIZED HEALTH CARE EDUCATION/TRAINING PROGRAM

## 2024-10-15 PROCEDURE — 99214 OFFICE O/P EST MOD 30 MIN: CPT | Mod: S$GLB,,, | Performed by: STUDENT IN AN ORGANIZED HEALTH CARE EDUCATION/TRAINING PROGRAM

## 2024-10-15 PROCEDURE — G2211 COMPLEX E/M VISIT ADD ON: HCPCS | Mod: S$GLB,,, | Performed by: STUDENT IN AN ORGANIZED HEALTH CARE EDUCATION/TRAINING PROGRAM

## 2024-10-15 NOTE — PROGRESS NOTES
Reason For Consultation:   Increased lifetime risk of breast cancer- follow up     Referring Provider:   None provided    Records Obtained: Records of the patients history including those obtained from the referring provider were reviewed and summarized in detail.    HPI:   Dorcas Ponce is a carolee 51yo woman who presents for consultation of increased risk of breast cancer. She is postmenopausal. She presented for screening mammogram on 2/10/22 which was benign; T-C lifetime risk 43.1%.     Interval History:  Ms. Ponce returns today for follow up. Denies recent breast changes or concerns today. Feeling well overall.     High Risk Breast cancer specific history:  - Age: 54 y.o.   - Height:  5'4''  - Weight: 172lbs  - Breast density per BI-RADS:  heterogeneously dense  - Age at menarche:  12yo  - Number of pregnancies: ; age of first live birth: 14yo  - History of breast feeding: No.   - Age at menopause, if applicable:  s/p IDALIA-BSO in 2020. Denies menopausal symptoms.  -Uterus and ovaries intact: No: s/p IDALIA-BSO as above  - HRT: Yes- on estrogen and progesterone x 2.5 years, stopped in   - Genetic testing: Yes - BRCA negative   - Personal history of cancer: No  - Previous chest radiation exposure between ages 10-30 years old: No  - Personal history of breast biopsy: Yes - hx of multiple biopsies, several benign and one ADH  - Ashkenazi Gnosticist Inheritance: No  - Family history of cancer:  Yes- paternal aunt- breast cancer dx at 51yo, paternal cousin- breast cancer dx at 41yo    Social History:  Tobacco use:  Denies  Alcohol use:  Socially  Exercise regimen: Walks regularly  Employment: works from home        Patient Active Problem List   Diagnosis    At high risk for breast cancer    Atypical ductal hyperplasia of breast     Medications    Current Outpatient Medications:     atorvastatin (LIPITOR) 20 MG tablet, Take 20 mg by mouth once daily., Disp: , Rfl:     cholecalciferol, vitamin D3,  "(VITAMIN D3) 50 mcg (2,000 unit) Tab, Take by mouth., Disp: , Rfl:     ergocalciferol, vitamin D2, (VITAMIN D ORAL), , Disp: , Rfl:     finasteride-tadalafil 5-5 mg Cap, , Disp: , Rfl:     LINZESS 145 mcg Cap capsule, Take 145 mcg by mouth every morning., Disp: , Rfl:     meloxicam (MOBIC) 15 MG tablet, Take 15 mg by mouth daily as needed., Disp: , Rfl:     spironolactone-hydrochlorothiazide (ALDACTAZIDE) 50-50 mg per tablet, , Disp: , Rfl:     XIIDRA 5 % Dpet, Place 1 drop into both eyes 2 (two) times daily., Disp: , Rfl:     Allergies  Review of patient's allergies indicates:   Allergen Reactions    Hydrocodone Nausea Only and Nausea And Vomiting       Review of Systems  Answers submitted by the patient for this visit:  Review of Systems Questionnaire (Submitted on 10/14/2024)  appetite change : No  unexpected weight change: No  mouth sores: No  visual disturbance: No  cough: No  shortness of breath: No  chest pain: No  abdominal pain: No  diarrhea: No  frequency: No  back pain: No  rash: No  headaches: No  adenopathy: No  nervous/ anxious: No      Objective:      Vitals:   Vitals:    10/15/24 1625   BP: 126/77   BP Location: Right arm   Patient Position: Sitting   Pulse: 86   Resp: 20   SpO2: 100%   Weight: 70 kg (154 lb 5.2 oz)   Height: 5' 4" (1.626 m)     BMI: Body mass index is 26.49 kg/m².   Body surface area is 1.78 meters squared.    Physical Exam  ECOG 0   General: well appearing, in no apparent distress  HEENT: Normocephalic, EOMI, anicteric sclerae, MMM  Neck: supple, without cervical or supraclavicular lymphadenopathy.  Heart: regular rate and rhythm, normal S1 and S2, no murmurs, gallops or rubs.  Lungs: Clear to auscultation bilaterally, no increased wob  Breast: no appreciable masses, skin changes, or nipple abnormality bilaterally, no axillary LAD  Abdomen: Soft, nontender, nondistended with normal bowel sounds. No hepatosplenomegaly.  Extremities: No LE edema or joint effusion  Skin: warm, " well-perfused, no rash  Neurologic: Alert and oriented x 4, normal speech and gait   Psychiatric: Conversing appropriately with providers throughout today's encounter.      Laboratory Data: reviewed most recent   Imaging: reviewed most recent    Assessment/Plan:   Ms. Rosado is a carolee 55yo woman with hx of ADH who presents today for evaluation for increased risk of breast cancer.     #Increased risk of breast cancer/History of ADH:  Tyrer-Cuzick (TC) lifetime risk of 36.6%. We discussed that TC score will categorize your lifetime risk of being diagnosed with breast cancer. Categories are as such: Average risk <15%, Intermediate risk 15-19%, and High risk > or = to 20%. I reviewed recommendations for women with elevated TC score including clinical breast exam q6-12mo, consideration of referral to genetic counseling, annual screening mammogram, annual breast MRI, consideration of risk reduction strategies and breast awareness.     Reviewed modifiable and non-modifiable risk factors, lifestyle modifications, as well as the role of chemoprevention.       --Patient has opted out of chemoprevention but will call in the future if she wishes to pursue. Can discuss further at follow up.   --proceed with alternating annual mammogram (due 3/67268) with annual breast US (would prefer this to MRI)- due 10/2025  --Lifestyle modifications as detailed above.   --Encouraged breast awareness, including monthly breast self-exams.       Questions were encouraged and answered to patient's satisfaction, and patient verbalized understanding of information and agreement with the plan. Advised patient to RTC with any interval changes or concerns.      Gail Mo MD      Med Onc Chart Routing      Follow up with physician    Follow up with [facility] 1 year.   Infusion scheduling note    Injection scheduling note    Labs    Imaging Mammogram   MMG due 3/2025   Pharmacy appointment    Other referrals                   Total time of this  visit, including time spent face to face with patient and/or via video/audio, and also in preparing for today's visit for MDM and documentation. (Medical Decision Making, including consideration of possible diagnoses, management options, complex medical record review, review of diagnostic tests and information, consideration and discussion of significant complications based on comorbidities, and discussion with providers involved with the care of the patient) 30 minutes. Greater than 50% was spent face to face with the patient counseling and coordinating care.

## 2025-02-19 ENCOUNTER — TELEPHONE (OUTPATIENT)
Dept: RADIOLOGY | Facility: HOSPITAL | Age: 55
End: 2025-02-19
Payer: COMMERCIAL

## 2025-02-19 NOTE — TELEPHONE ENCOUNTER
----- Message from KAYLA Howard sent at 2/19/2025  9:04 AM CST -----  Regarding: FW: Request Orders  Contact: Pt @[phone]  Good morning, Can you please assist this patient in getting this MMG scheduled? Thanks in advance!Anita Mirza RN  ----- Message -----  From: Gail Mo MD  Sent: 2/18/2025   4:26 PM CST  To: Anita Fairbanks RN  Subject: RE: Request Orders                               It looks like there is an order from me for a contrast enhanced MMG to be scheduled. Let me know if you can't see it and I can re-order.  ----- Message -----  From: Anita Fairbanks RN  Sent: 2/18/2025   2:31 PM CST  To: Gail Mo MD  Subject: FW: Request Orders                               Can you please place a MMG order for this patient and let me know when it is in please so I can make sure she gets scheduled? Thanks so much!Anita RUTH RN  ----- Message -----  From: Yadira Hamilton  Sent: 2/18/2025   1:52 PM CST  To: Chely Peralta  Subject: Request Orders                                   Pt is calling to speak with someone in the office to request and order for mammogram.   Pt received a letter in the mail to schedule; no order in Epic to schedule from. Pt is asking for a return call back once the order is in, so that they can be scheduled. Please call pt to advise. Thanks.

## 2025-02-19 NOTE — TELEPHONE ENCOUNTER
[facility]2025 . Patient was instructed on fasting for the contrast mammogram, nothing to eat or drink 2 hours prior to the contrast mammogram.

## 2025-03-11 ENCOUNTER — PATIENT MESSAGE (OUTPATIENT)
Dept: RADIOLOGY | Facility: HOSPITAL | Age: 55
End: 2025-03-11
Payer: COMMERCIAL

## 2025-03-17 ENCOUNTER — HOSPITAL ENCOUNTER (OUTPATIENT)
Dept: RADIOLOGY | Facility: HOSPITAL | Age: 55
Discharge: HOME | End: 2025-03-17
Attending: STUDENT IN AN ORGANIZED HEALTH CARE EDUCATION/TRAINING PROGRAM
Payer: COMMERCIAL

## 2025-03-17 DIAGNOSIS — Z91.89 AT HIGH RISK FOR BREAST CANCER: ICD-10-CM

## 2025-03-17 PROCEDURE — 25500020 PHARM REV CODE 255: Performed by: STUDENT IN AN ORGANIZED HEALTH CARE EDUCATION/TRAINING PROGRAM

## 2025-03-17 PROCEDURE — 77067 SCR MAMMO BI INCL CAD: CPT | Mod: TC

## 2025-03-17 RX ADMIN — IOHEXOL 110 ML: 350 INJECTION, SOLUTION INTRAVENOUS at 03:03

## 2025-04-17 ENCOUNTER — OFFICE VISIT (OUTPATIENT)
Dept: HEMATOLOGY/ONCOLOGY | Facility: CLINIC | Age: 55
End: 2025-04-17
Payer: COMMERCIAL

## 2025-04-17 DIAGNOSIS — Z91.89 AT HIGH RISK FOR BREAST CANCER: Primary | ICD-10-CM

## 2025-04-17 NOTE — PROGRESS NOTES
Physical Therapy Visit    Visit Type: Daily Treatment Note  Visit: 22  Referring Provider: Cong Andrade MD  Medical Diagnosis (from order): M17.11 - Primary osteoarthritis of right knee  Z96.651 - Status post total knee replacement, right     SUBJECTIVE                                                                                                               Patient denies changes since last session. Knee has been feeling fine. Still having intermittent knee buckling.   Functional Change: None reported   Walked at work Thursday without crutch       OBJECTIVE                                                                                                                                       Treatment     Therapeutic Exercise  Therapeutic Exercise with Blood Flow Restriction:   Bevvy Personal Tourniquet System utilized    Patient was educated on expectations and symptoms following treatment with use of blood flow restriction.     34 inch cuff utilized with protection sleeve; applied at proximal right lower extremity    Limb Occlusion Pressure 205 mmHg (measured in supine)    Personal Tourniquet Pressure 103 mmHg (80% of LOP)    Blood Pressure: 142/90 mmHg     Repetitions performed: 30/15/15/15 - 30 seconds rest break between each set and at least 60 seconds deflation between each exercise.    Exercises performed:    Therapy Ball hamstring curls with bridge and end with quad set 3 sec (had to reduce to 70% LOP last set of 15, but only can complete 5 reps in time frame)  Leg press, seat 6, foot plate 7, single limb, 25 pounds x 30 reps, then 35 pounds for following sets (with left foot on foot bar), back up to 80% LOP  Seated knee extension isometric, RLE 55 degree angle (knee adjustment at 7, foot cushion at 4)    Patient had good tolerance to BFR with 70-80% occlusion of right Lower Extremity , no adverse reactions.    Educated in scar tissue massage right total knee arthroplasty incision with instruction  The patient location is: [place]  The chief complaint leading to consultation is: breast cancer    Visit type: audiovisual    Each patient to whom he or she provides medical services by telemedicine is:  (1) informed of the relationship between the physician and patient and the respective role of any other health care provider with respect to management of the patient; and (2) notified that he or she may decline to receive medical services by telemedicine and may withdraw from such care at any time.      Reason For Consultation:   Increased lifetime risk of breast cancer- follow up     Referring Provider:   None provided    Records Obtained: Records of the patients history including those obtained from the referring provider were reviewed and summarized in detail.    HPI:   Dorcsa Ponce is a carolee 53yo woman who presents for consultation of increased risk of breast cancer. She is postmenopausal. She presented for screening mammogram on 2/10/22 which was benign; T-C lifetime risk 43.1%.     Interval History:  Ms. Ponce returns today for follow up. She has been doing fairly well since her last visit. Reports recent difficulty with diverticulitis; now following with GI and undergoing workup. Denies recent breast changes or concerns today.     High Risk Breast cancer specific history:  - Age: 54 y.o.   - Height:  5'4''  - Weight: 172lbs  - Breast density per BI-RADS:  heterogeneously dense  - Age at menarche:  14yo  - Number of pregnancies: ; age of first live birth: 16yo  - History of breast feeding: No.   - Age at menopause, if applicable:  s/p IDALIA-BSO in 2020. Denies menopausal symptoms.  -Uterus and ovaries intact: No: s/p IDALIA-BSO as above  - HRT: Yes- on estrogen and progesterone x 2.5 years, stopped in   - Genetic testing: Yes - BRCA negative   - Personal history of cancer: No  - Previous chest radiation exposure between ages 10-30 years old: No  - Personal history of breast biopsy: Yes - hx of  in multi-directional skin rolling.      Neuromuscular Re-Education  Forward step up, 2 inch step, BUE support  - x 10 RLE leading, cueing for forward weight shift   Forward step u, 4 inch step, BUE progressing to single UE support  - x 10 RLE, cueing for hip drop     Skilled input: verbal instruction/cues, tactile instruction/cues, facilitation, inhibition and as detailed above    Writer verbally educated and received verbal consent for hand placement, positioning of patient, and techniques to be performed today from patient for therapist position for techniques, modality application and clothing adjustments for techniques as described above and how they are pertinent to the patient's plan of care.  Home Exercise Program  Access Code: [email or web address]  URL: [email or web address]  Date: 10/02/2024  Prepared by: Megan Poon    Exercises  - Supine Ankle Pumps  - 3-4 x daily - 7 x weekly - 20 reps  - Supine Quad Set  - 3-4 x daily - 7 x weekly - 10 reps - 5 hold  - Supine Heel Slide with Strap  - 3-4 x daily - 7 x weekly - 10 reps - 10 hold  - Seated Heel Slide  - 3-4 x daily - 7 x weekly - 10 reps - 10 hold  - Seated Hamstring Stretch  - 3-4 x daily - 7 x weekly - 2 reps - 30 hold  - Long Sitting Calf Stretch with Strap  - 3-4 x daily - 7 x weekly - 3 sets - 30sec hold  - Supine Knee Extension Stretch on Towel Roll  - 3-4 x daily - 7 x weekly - 3 sets - 1-3 minutes hold  - Prone Terminal Knee Extension  - 2 x daily - 7 x weekly - 2 sets - 10 reps - 5secs hold  - Prone Knee Extension Hang  - 2 x daily - 7 x weekly - 3 sets - 10 reps - 1-3 minutes hold  - Sit to Stand Without Arm Support  - 2 x daily - 7 x weekly - 2 sets - 5 reps  - Side Stepping with Resistance at Thighs and Counter Support  - 1 x daily - 7 x weekly - 2 sets - 10 reps  - Single Leg Balance with Clock Reach  - 1 x daily - 7 x weekly - 2 sets - 10 reps      ASSESSMENT                                                                        multiple biopsies, several benign and one ADH  - Ashkenazi Mormon Inheritance: No  - Family history of cancer:  Yes- paternal aunt- breast cancer dx at 49yo, paternal cousin- breast cancer dx at 41yo    Social History:  Tobacco use:  Denies  Alcohol use:  Socially  Exercise regimen: Walks regularly  Employment: works from home        Patient Active Problem List   Diagnosis    At high risk for breast cancer    Atypical ductal hyperplasia of breast     Medications    Current Outpatient Medications:     atorvastatin (LIPITOR) 20 MG tablet, Take 20 mg by mouth once daily., Disp: , Rfl:     cholecalciferol, vitamin D3, (VITAMIN D3) 50 mcg (2,000 unit) Tab, Take by mouth., Disp: , Rfl:     ergocalciferol, vitamin D2, (VITAMIN D ORAL), , Disp: , Rfl:     finasteride-tadalafil 5-5 mg Cap, , Disp: , Rfl:     LINZESS 145 mcg Cap capsule, Take 145 mcg by mouth every morning., Disp: , Rfl:     meloxicam (MOBIC) 15 MG tablet, Take 15 mg by mouth daily as needed., Disp: , Rfl:     spironolactone-hydrochlorothiazide (ALDACTAZIDE) 50-50 mg per tablet, , Disp: , Rfl:     Allergies  Review of patient's allergies indicates:   Allergen Reactions    Hydrocodone Nausea Only and Nausea And Vomiting       Review of Systems  Answers submitted by the patient for this visit:  Review of Systems Questionnaire (Submitted on 4/17/2025)  appetite change : No  unexpected weight change: No  mouth sores: No  visual disturbance: No  cough: No  shortness of breath: No  chest pain: No  abdominal pain: No  diarrhea: No  frequency: No  back pain: No  rash: No  headaches: No  adenopathy: No  nervous/ anxious: No        Objective:      Vitals:   There were no vitals filed for this visit. Virtual visit     BMI: There is no height or weight on file to calculate BMI.   There is no height or weight on file to calculate BSA.    Physical Exam  ECOG 0   General: well appearing, in no apparent distress  HEENT: Normocephalic, EOMI  Lungs: no increased wob  Skin: no                                       Patient with improved tolerance to single limb leg press this session compared. She is challenged with swiss ball hamstring curl with bridge, requiring reduction of pressure on blood flow unit and isn't able to complete full last set. She has improved weight shift forward and eccentric control with 2 inch step up but is challenged with 4 inch step without compensation at the hip. Patient will continue to benefit from skilled physical therapy to address deficits.   Education:   - Results of above outlined education: Verbalizes understanding and Demonstrates understanding    PLAN                                                                                                                           Suggestions for next session as indicated: Progress per plan of care       Therapy procedure time and total treatment time can be found documented on the Time Entry flowsheet     rash  Neurologic: Alert and oriented x 4, normal speech  Psychiatric: Conversing appropriately with providers throughout today's encounter.      Laboratory Data: reviewed most recent   Imaging: reviewed most recent      Assessment/Plan:   Ms. Rosado is a carolee 55yo woman with hx of ADH who presents today for evaluation for increased risk of breast cancer.     #Increased risk of breast cancer/History of ADH:  Tyrer-Cuzick (TC) lifetime risk of 33.98%. We discussed that TC score will categorize your lifetime risk of being diagnosed with breast cancer. Categories are as such: Average risk <15%, Intermediate risk 15-19%, and High risk > or = to 20%. I reviewed recommendations for women with elevated TC score including clinical breast exam q6-12mo, consideration of referral to genetic counseling, annual screening mammogram, annual breast MRI, consideration of risk reduction strategies and breast awareness.     Reviewed modifiable and non-modifiable risk factors, lifestyle modifications, as well as the role of chemoprevention.       --Patient has opted out of chemoprevention but will call in the future if she wishes to pursue. Can discuss further at follow up.   --proceed with alternating annual mammogram (due 3/2026) with annual breast MRI. Had contrast enhanced MMG 3/2025 that was normal. Discussed that with contrast enhanced MMG would not necessarily need MRI. She would like to move ahead with MRI in September but plan for contrast enhanced MMG only following  --Lifestyle modifications as detailed above.   --Encouraged breast awareness, including monthly breast self-exams.       Questions were encouraged and answered to patient's satisfaction, and patient verbalized understanding of information and agreement with the plan. Advised patient to RTC with any interval changes or concerns.      Gail Mo MD      Med Onc Chart Routing      Follow up with physician    Follow up with [facility] 1 year. T-C follow up visit    Infusion scheduling note    Injection scheduling note    Labs    Imaging MRI   MRI breast due in 9/2025   Pharmacy appointment    Other referrals                   Total time of this visit, including time spent face to face with patient and/or via video/audio, and also in preparing for today's visit for MDM and documentation. (Medical Decision Making, including consideration of possible diagnoses, management options, complex medical record review, review of diagnostic tests and information, consideration and discussion of significant complications based on comorbidities, and discussion with providers involved with the care of the patient) 30 minutes. Greater than 50% was spent face to face with the patient counseling and coordinating care.